# Patient Record
Sex: MALE | RURAL
[De-identification: names, ages, dates, MRNs, and addresses within clinical notes are randomized per-mention and may not be internally consistent; named-entity substitution may affect disease eponyms.]

---

## 2024-02-06 ENCOUNTER — ATHLETIC TRAINING SESSION (OUTPATIENT)
Dept: SPORTS MEDICINE | Facility: CLINIC | Age: 19
End: 2024-02-06

## 2024-02-07 ENCOUNTER — ATHLETIC TRAINING SESSION (OUTPATIENT)
Dept: SPORTS MEDICINE | Facility: CLINIC | Age: 19
End: 2024-02-07

## 2024-02-12 NOTE — PROGRESS NOTES
Tweaked right hamstring during practice. Going to work on flexibility and strengthening exercises.

## 2024-02-12 NOTE — PROGRESS NOTES
Subjective:       Chief Complaint: Brayden Willson is a 18 y.o. male student at [employer] ([place]) who had no chief complaint listed for this encounter.    HPI    ROS              Objective:       General: Brayden is well-developed, well-nourished, appears stated age, in no acute distress, alert and oriented to time, place and person.     AT Session          Assessment:     Status:      Date Seen:     Date of Injury:     Date Out:     Date Cleared:       Plan:       1. [facility]ICE  2. Physician Referral:   3. ED Referral:  4. Parent/Guardian Notified:   5. All questions were answered, ath. will contact me for questions or concerns in  the interim.  6.         Eligible to use School Insurance: Yes

## 2024-02-12 NOTE — PROGRESS NOTES
Athlete has some upper back pain and tightness after practices. Going to start on some core stabilization and strengthening exercises.

## 2024-03-19 ENCOUNTER — ATHLETIC TRAINING SESSION (OUTPATIENT)
Dept: SPORTS MEDICINE | Facility: CLINIC | Age: 19
End: 2024-03-19

## 2024-03-20 NOTE — PROGRESS NOTES
Subjective:       Chief Complaint: Brayden Willson is a 18 y.o. male student at [employer] ([place]) who had concerns including Injury and Pain of the Right Wrist (Right wrist sprain ).    Athlete was playing against carly in a game when he took a fastball straight to the right wrist. He continued to play but was in pain. No signs or symptoms of a fracture.     Handedness: right-handed  Sport played:      Level:          Brayden also participates in baseball.  Injury    Pain        ROS              Objective:       General: Brayden is well-developed, well-nourished, appears stated age, in no acute distress, alert and oriented to time, place and person.     AT Session      Right wrist sprain / contusion     Assessment:     Status:     Date Seen:     Date of Injury:     Date Out:     Date Cleared:       Plan:       1. Heat before practice, ice after and tape wrist   2. Physician Referral: no  3. ED Referral:no  4. Parent/Guardian Notified: No  5. All questions were answered, ath. will contact me for questions or concerns in  the interim.  6.         Eligible to use School Insurance: Yes

## 2024-04-11 ENCOUNTER — ATHLETIC TRAINING SESSION (OUTPATIENT)
Dept: SPORTS MEDICINE | Facility: CLINIC | Age: 19
End: 2024-04-11

## 2024-04-16 NOTE — PROGRESS NOTES
Reason for Encounter N/A    Subjective:       Chief Complaint: Brayden Willson is a 18 y.o. male student at [employer] ([place]) who had no chief complaint listed for this encounter.    Jose Alfreod strained his right hamstring during practice. He is still playing through the pain but getting treatment on it.     Handedness: left-handed  Sport played:      Level:          Brayden also participates in baseball.      ROS              Objective:       General: Brayden is well-developed, well-nourished, appears stated age, in no acute distress, alert and oriented to time, place and person.     AT Session    Right hamstring strain       Assessment:     Status:     Date Seen:     Date of Injury:     Date Out:     Date Cleared:       Plan:       1. Stretch and hamstring exercises   2. Physician Referral: no  3. ED Referral:no  4. Parent/Guardian Notified: No  5. All questions were answered, ath. will contact me for questions or concerns in  the interim.  6.         Eligible to use School Insurance: Yes

## 2024-04-29 ENCOUNTER — ATHLETIC TRAINING SESSION (OUTPATIENT)
Dept: SPORTS MEDICINE | Facility: CLINIC | Age: 19
End: 2024-04-29

## 2024-05-01 NOTE — PROGRESS NOTES
Reason for Encounter N/A    Subjective:       Chief Complaint: Brayden Willson is a 18 y.o. male student at [employer] ([place]) who had no chief complaint listed for this encounter.    Bilateral hamstring rehab    Handedness: left-handed  Sport played:      Level:          Brayden also participates in baseball.      ROS              Objective:       General: Brayden is well-developed, well-nourished, appears stated age, in no acute distress, alert and oriented to time, place and person.     AT Session    Hamstring rehab       Assessment:     Status:     Date Seen:     Date of Injury:     Date Out:     Date Cleared:       Plan:       1. Heat, stim, stretch   2. Physician Referral: no  3. ED Referral:no  4. Parent/Guardian Notified:   5. All questions were answered, ath. will contact me for questions or concerns in  the interim.  6.         Eligible to use School Insurance: Yes

## 2024-05-31 ENCOUNTER — ATHLETIC TRAINING SESSION (OUTPATIENT)
Dept: SPORTS MEDICINE | Facility: CLINIC | Age: 19
End: 2024-05-31